# Patient Record
Sex: FEMALE | Race: WHITE | Employment: OTHER | URBAN - METROPOLITAN AREA
[De-identification: names, ages, dates, MRNs, and addresses within clinical notes are randomized per-mention and may not be internally consistent; named-entity substitution may affect disease eponyms.]

---

## 2017-04-28 ENCOUNTER — OFFICE VISIT (OUTPATIENT)
Dept: INTERNAL MEDICINE CLINIC | Age: 52
End: 2017-04-28

## 2017-04-28 VITALS
DIASTOLIC BLOOD PRESSURE: 65 MMHG | OXYGEN SATURATION: 100 % | WEIGHT: 152 LBS | SYSTOLIC BLOOD PRESSURE: 114 MMHG | HEIGHT: 66 IN | HEART RATE: 65 BPM | RESPIRATION RATE: 18 BRPM | TEMPERATURE: 97.7 F | BODY MASS INDEX: 24.43 KG/M2

## 2017-04-28 DIAGNOSIS — M54.41 ACUTE RIGHT-SIDED LOW BACK PAIN WITH RIGHT-SIDED SCIATICA: Primary | ICD-10-CM

## 2017-04-28 RX ORDER — HYDROCODONE BITARTRATE AND ACETAMINOPHEN 7.5; 325 MG/1; MG/1
1 TABLET ORAL
Qty: 20 TAB | Refills: 0 | Status: SHIPPED | OUTPATIENT
Start: 2017-04-28 | End: 2017-12-04

## 2017-04-28 RX ORDER — METHYLPREDNISOLONE 4 MG/1
TABLET ORAL
Qty: 1 DOSE PACK | Refills: 0 | Status: SHIPPED | OUTPATIENT
Start: 2017-04-28 | End: 2017-12-04

## 2017-04-28 NOTE — PATIENT INSTRUCTIONS

## 2017-04-28 NOTE — MR AVS SNAPSHOT
Visit Information Date & Time Provider Department Dept. Phone Encounter #  
 4/28/2017  2:45 PM Héctor Julio PA-C [facility] [phone] Follow-up Instructions Return if symptoms worsen or fail to improve. Upcoming Health Maintenance Date Due Hepatitis C Screening 1965 DTaP/Tdap/Td series (1 - Tdap) 12/13/1984 PAP AKA CERVICAL CYTOLOGY 12/13/1984 BREAST CANCER SCRN MAMMOGRAM 12/13/2013 FOBT Q 1 YEAR AGE 50-75 12/13/2013 INFLUENZA AGE 9 TO ADULT 8/1/2016 Allergies as of 4/28/2017  Review Complete On: 4/28/2017 By: Héctor Julio PA-C No Known Allergies Current Immunizations  Reviewed on 2/21/2011 Name Date PPD 2/21/2011 TB Skin Test (PPD) Intradermal 3/26/2013 Not reviewed this visit You Were Diagnosed With   
  
 Codes Comments Acute right-sided low back pain with right-sided sciatica    -  Primary ICD-10-CM: M54.41 
ICD-9-CM: 724.2, 724.3 Vitals BP Pulse Temp Resp Height(growth percentile) Weight(growth percentile) 114/65 (BP 1 Location: Left arm, BP Patient Position: Sitting) 65 97.7 °F (36.5 °C) (Oral) 18 5' 6\" (1.676 m) 152 lb (68.9 kg) LMP SpO2 BMI OB Status Smoking Status 04/22/2017 100% 24.53 kg/m2 Having regular periods Never Smoker Vitals History BMI and BSA Data Body Mass Index Body Surface Area 24.53 kg/m 2 1.79 m 2 Preferred Pharmacy Pharmacy Name Phone [facility] [address] [place], [address] [phone] Your Updated Medication List  
  
   
This list is accurate as of: 4/28/17  3:34 PM.  Always use your most recent med list.  
  
  
  
  
 escitalopram oxalate 10 mg tablet Commonly known as:  Ranell Roosevelt Take 1 Tab by mouth daily. fluticasone 50 mcg/actuation nasal spray Commonly known as:  FLONASE  
1 [place] by Both Nostrils route two (2) times a day. HYDROcodone-acetaminophen 7.5-325 mg per tablet Commonly known as:  14[address] Take 1 Tab by mouth every six (6) hours as needed for Pain. Max Daily Amount: 4 Tabs. meclizine 25 mg tablet Commonly known as:  ANTIVERT Take 1 Tab by mouth three (3) times daily as needed for Dizziness or Nausea. methylPREDNISolone 4 mg tablet Commonly known as:  MEDROL (FRANCISCO JAVIER) Use as directed XANAX 2 mg tablet Generic drug:  ALPRAZolam  
Take  by mouth. Prescriptions Printed Refills HYDROcodone-acetaminophen (NORCO) 7.5-325 mg per tablet 0 Sig: Take 1 Tab by mouth every six (6) hours as needed for Pain. Max Daily Amount: 4 Tabs. Class: Print Route: Oral  
  
Prescriptions Sent to Pharmacy Refills  
 methylPREDNISolone (MEDROL, FRANCISCO JAVIER,) 4 mg tablet 0 Sig: Use as directed Class: Normal  
 Pharmacy: [id number] - [place], [address] #: [phone] Follow-up Instructions Return if symptoms worsen or fail to improve. To-Do List   
 04/28/2017 Imaging:  XR SPINE LUMB 2 OR 3 V Patient Instructions Low Back Pain: Exercises Your Care Instructions Here are some examples of typical rehabilitation exercises for your condition. Start each exercise slowly. Ease off the exercise if you start to have pain. Your doctor or physical therapist will tell you when you can start these exercises and which ones will work best for you. How to do the exercises Press-up 1. Lie on your stomach, supporting your body with your forearms. 2. Press your elbows down into the floor to raise your upper back. As you do this, relax your stomach muscles and allow your back to arch without using your back muscles. As your press up, do not let your hips or pelvis come off the floor. 3. Hold for 15 to 30 seconds, then relax. 4. Repeat 2 to 4 times. Alternate arm and leg (bird dog) exercise Note: Do this exercise slowly. Try to keep your body straight at all times, and do not let one hip drop lower than the other. 1. Start on the floor, on your hands and knees. 2. Tighten your belly muscles. 3. Raise one leg off the floor, and hold it straight out behind you. Be careful not to let your hip drop down, because that will twist your trunk. 4. Hold for about 6 seconds, then lower your leg and switch to the other leg. 5. Repeat 8 to 12 times on each leg. 6. Over time, work up to holding for 10 to 30 seconds each time. 7. If you feel stable and secure with your leg raised, try raising the opposite arm straight out in front of you at the same time. Knee-to-chest exercise 1. Lie on your back with your knees bent and your feet flat on the floor. 2. Bring one knee to your chest, keeping the other foot flat on the floor (or keeping the other leg straight, whichever feels better on your lower back). 3. Keep your lower back pressed to the floor. Hold for at least 15 to 30 seconds. 4. Relax, and lower the knee to the starting position. 5. Repeat with the other leg. Repeat 2 to 4 times with each leg. 6. To get more stretch, put your other leg flat on the floor while pulling your knee to your chest. 
Curl-ups 1. Lie on the floor on your back with your knees bent at a 90-degree angle. Your feet should be flat on the floor, about 12 inches from your buttocks. 2. Cross your arms over your chest. If this bothers your neck, try putting your hands behind your neck (not your head), with your elbows spread apart. 3. Slowly tighten your belly muscles and raise your shoulder blades off the floor. 4. Keep your head in line with your body, and do not press your chin to your chest. 
5. Hold this position for 1 or 2 seconds, then slowly lower yourself back down to the floor. 6. Repeat 8 to 12 times. Pelvic tilt exercise 1. Lie on your back with your knees bent. 2. \"Brace\" your stomach. This means to tighten your muscles by pulling in and imagining your belly button moving toward your spine. You should feel like your back is pressing to the floor and your hips and pelvis are rocking back. 3. Hold for about 6 seconds while you breathe smoothly. 4. Repeat 8 to 12 times. Heel dig bridging 1. Lie on your back with both knees bent and your ankles bent so that only your heels are digging into the floor. Your knees should be bent about 90 degrees. 2. Then push your heels into the floor, squeeze your buttocks, and lift your hips off the floor until your shoulders, hips, and knees are all in a straight line. 3. Hold for about 6 seconds as you continue to breathe normally, and then slowly lower your hips back down to the floor and rest for up to 10 seconds. 4. Do 8 to 12 repetitions. Hamstring stretch in doorway 1. Lie on your back in a doorway, with one leg through the open door. 2. Slide your leg up the wall to straighten your knee. You should feel a gentle stretch down the back of your leg. 3. Hold the stretch for at least 15 to 30 seconds. Do not arch your back, point your toes, or bend either knee. Keep one heel touching the floor and the other heel touching the wall. 4. Repeat with your other leg. 5. Do 2 to 4 times for each leg. Hip flexor stretch 1. Kneel on the floor with one knee bent and one leg behind you. Place your forward knee over your foot. Keep your other knee touching the floor. 2. Slowly push your hips forward until you feel a stretch in the upper thigh of your rear leg. 3. Hold the stretch for at least 15 to 30 seconds. Repeat with your other leg. 4. Do 2 to 4 times on each side. Wall sit 1. Stand with your back 10 to 12 inches away from a wall. 2. Lean into the wall until your back is flat against it. 3. Slowly slide down until your knees are slightly bent, pressing your lower back into the wall. 4. Hold for about 6 seconds, then slide back up the wall. 5. Repeat 8 to 12 times. Follow-up care is a key part of your treatment and safety. Be sure to make and go to all appointments, and call your doctor if you are having problems. It's also a good idea to know your test results and keep a list of the medicines you take. Where can you learn more? Go to http://tracey-brandon.info/. Enter H529 in the search box to learn more about \"Low Back Pain: Exercises. \" Current as of: May 23, 2016 Content Version: 11.2 © 1434-0216 VODECLIC. Care instructions adapted under license by [employer] (which disclaims liability or warranty for this information). If you have questions about a medical condition or this instruction, always ask your healthcare professional. [address] any warranty or liability for your use of this information. Introducing hospitals & HEALTH SERVICES! Deny Hardwick introduces EchoSign patient portal. Now you can access parts of your medical record, email your doctor's office, and request medication refills online. 1. In your internet browser, go to [email or web address] 2. Click on the First Time User? Click Here link in the Sign In box. You will see the New Member Sign Up page. 3. Enter your EchoSign Access Code exactly as it appears below. You will not need to use this code after youve completed the sign-up process. If you do not sign up before the expiration date, you must request a new code. · EchoSign Access Code: [email or web address] Expires: 7/27/2017  3:34 PM 
 
4. Enter the last four digits of your Social Security Number (xxxx) and Date of Birth (mm/dd/yyyy) as indicated and click Submit. You will be taken to the next sign-up page. 5. Create a EchoSign ID. This will be your EchoSign login ID and cannot be changed, so think of one that is secure and easy to remember. 6. Create a [facility] password. You can change your password at any time. 7. Enter your Password Reset Question and Answer. This can be used at a later time if you forget your password. 8. Enter your e-mail address. You will receive e-mail notification when new information is available in [address] 9. Click Sign Up. You can now view and download portions of your medical record. 10. Click the Download Summary menu link to download a portable copy of your medical information. If you have questions, please visit the Frequently Asked Questions section of the [facility] website. Remember, [facility] is NOT to be used for urgent needs. For medical emergencies, dial 911. Now available from your iPhone and Android! Please provide this summary of care documentation to your next provider. If you have any questions after today's visit, please call [phone].

## 2017-04-28 NOTE — PROGRESS NOTES
HISTORY OF PRESENT ILLNESS  Francisca Sanderson is a 46 y.o. female. HPI Ms. Clarita Kaufman is here for 1 day - starting today h/o right sided low back pain radiating down her right leg. She has 800mg motrin at home which she has taken 1 of with no relief of pain. She is having back tightness and spasm. Her sx started today when she had bent over at her sink and stood back up. She has a h/o intermittent back pain, stating she develops similar sx a few times per year. She does not usually go to the doctor for it and the pain will usually resolve in a few days. The pain is more severe today which is why she came in. Review of Systems   Constitutional: Negative. HENT: Negative. Respiratory: Negative. Cardiovascular: Negative. Gastrointestinal: Negative. Genitourinary: Negative. Musculoskeletal: Positive for back pain. Neurological: Positive for tingling (right leg). Negative for focal weakness. Physical Exam   Constitutional: She is oriented to person, place, and time. She appears well-developed and well-nourished. Cardiovascular: Normal rate. Pulmonary/Chest: Effort normal.   Musculoskeletal:        Lumbar back: She exhibits decreased range of motion, tenderness and pain. Back:    Neurological: She is alert and oriented to person, place, and time. Visit Vitals    /65 (BP 1 Location: Left arm, BP Patient Position: Sitting)    Pulse 65    Temp 97.7 °F (36.5 °C) (Oral)    Resp 18    Ht 5' 6\" (1.676 m)    Wt 152 lb (68.9 kg)    SpO2 100%    BMI 24.53 kg/m2     There is no  record of pt. ASSESSMENT and PLAN    ICD-10-CM ICD-9-CM    1.  Acute right-sided low back pain with right-sided sciatica M54.41 724.2 XR SPINE LUMB 2 OR 3 V     724.3 HYDROcodone-acetaminophen (NORCO) 7.5-325 mg per tablet      methylPREDNISolone (MEDROL, FRANCISCO JAVIER,) 4 mg tablet     Pt verbalized understanding of their condition and diagnoses, treatment plan,  as well as side effects of any new medications prescribed.

## 2017-04-28 NOTE — PROGRESS NOTES
Chief Complaint   Patient presents with    Establish Care    Back Pain     Pt states its her sciatic nerve. Pt states she has not seen anyone for her back at all     1. Have you been to the ER, urgent care clinic since your last visit? Hospitalized since your last visit? No    2. Have you seen or consulted any other health care providers outside of the [address] [place] [address] since your last visit? Include any pap smears or colon screening.  No

## 2017-05-01 ENCOUNTER — TELEPHONE (OUTPATIENT)
Dept: INTERNAL MEDICINE CLINIC | Age: 52
End: 2017-05-01

## 2017-05-01 NOTE — TELEPHONE ENCOUNTER
Her xray showed arthritic changes and degenerative disc disease. If sx persist, she would need an MRI.

## 2017-05-01 NOTE — LETTER
5/2/2017 5:13 PM 
 
Ms. Aleksander Valerio[address] [place] [address] Our office has tried reaching you regarding you recent x-rays. Your xray showed arthritic changes and degenerative disc disease. If your symptoms persist, you would need an MRI.   
 
 
 
 
 
Sincerely, 
 
 
Dione Galarza PA-C

## 2017-05-15 ENCOUNTER — TELEPHONE (OUTPATIENT)
Dept: INTERNAL MEDICINE CLINIC | Age: 52
End: 2017-05-15

## 2017-05-15 DIAGNOSIS — M54.5 LOW BACK PAIN, UNSPECIFIED BACK PAIN LATERALITY, UNSPECIFIED CHRONICITY, WITH SCIATICA PRESENCE UNSPECIFIED: Primary | ICD-10-CM

## 2017-05-15 DIAGNOSIS — M51.36 DDD (DEGENERATIVE DISC DISEASE), LUMBAR: ICD-10-CM

## 2017-05-15 DIAGNOSIS — M47.816 FACET ARTHROPATHY, LUMBAR: ICD-10-CM

## 2017-05-23 DIAGNOSIS — M51.36 DDD (DEGENERATIVE DISC DISEASE), LUMBAR: ICD-10-CM

## 2017-05-23 DIAGNOSIS — M54.5 LOW BACK PAIN, UNSPECIFIED BACK PAIN LATERALITY, UNSPECIFIED CHRONICITY, WITH SCIATICA PRESENCE UNSPECIFIED: ICD-10-CM

## 2017-05-23 DIAGNOSIS — M47.816 FACET ARTHROPATHY, LUMBAR: ICD-10-CM

## 2017-06-07 ENCOUNTER — TELEPHONE (OUTPATIENT)
Dept: INTERNAL MEDICINE CLINIC | Age: 52
End: 2017-06-07

## 2017-06-09 DIAGNOSIS — M51.36 DEGENERATIVE DISC DISEASE, LUMBAR: Primary | ICD-10-CM

## 2017-06-09 NOTE — TELEPHONE ENCOUNTER
She has multilevel degenerative changes. L3-4 has has moderate to severe disc space narrowing. If her pain is still severe I will refer her to neurosurgery for an evaluation.

## 2017-12-04 ENCOUNTER — OFFICE VISIT (OUTPATIENT)
Dept: INTERNAL MEDICINE CLINIC | Age: 52
End: 2017-12-04

## 2017-12-04 VITALS
TEMPERATURE: 98.1 F | RESPIRATION RATE: 18 BRPM | WEIGHT: 159 LBS | SYSTOLIC BLOOD PRESSURE: 123 MMHG | BODY MASS INDEX: 25.55 KG/M2 | DIASTOLIC BLOOD PRESSURE: 83 MMHG | HEIGHT: 66 IN | OXYGEN SATURATION: 99 % | HEART RATE: 70 BPM

## 2017-12-04 DIAGNOSIS — F51.01 PRIMARY INSOMNIA: ICD-10-CM

## 2017-12-04 DIAGNOSIS — M54.5 LOW BACK PAIN, UNSPECIFIED BACK PAIN LATERALITY, UNSPECIFIED CHRONICITY, WITH SCIATICA PRESENCE UNSPECIFIED: Primary | ICD-10-CM

## 2017-12-04 LAB
BILIRUB UR QL STRIP: NEGATIVE
GLUCOSE UR-MCNC: NEGATIVE MG/DL
KETONES P FAST UR STRIP-MCNC: NEGATIVE MG/DL
PH UR STRIP: 7 [PH] (ref 4.6–8)
PROT UR QL STRIP: NEGATIVE
SP GR UR STRIP: 1.02 (ref 1–1.03)
UA UROBILINOGEN AMB POC: NORMAL (ref 0.2–1)
URINALYSIS CLARITY POC: CLEAR
URINALYSIS COLOR POC: YELLOW
URINE BLOOD POC: NEGATIVE
URINE LEUKOCYTES POC: NEGATIVE
URINE NITRITES POC: NEGATIVE

## 2017-12-04 RX ORDER — HYDROCODONE BITARTRATE AND ACETAMINOPHEN 5; 325 MG/1; MG/1
1 TABLET ORAL
Qty: 20 TAB | Refills: 0 | Status: SHIPPED | OUTPATIENT
Start: 2017-12-04 | End: 2018-05-03

## 2017-12-04 NOTE — PATIENT INSTRUCTIONS

## 2017-12-04 NOTE — MR AVS SNAPSHOT
Visit Information Date & Time Provider Department Dept. Phone Encounter #  
 12/4/2017 10:45 AM Maria Luisa Banuelos PA-C [facility] [phone] Follow-up Instructions Return if symptoms worsen or fail to improve. Upcoming Health Maintenance Date Due Hepatitis C Screening 1965 DTaP/Tdap/Td series (1 - Tdap) 12/13/1984 PAP AKA CERVICAL CYTOLOGY 12/13/1984 BREAST CANCER SCRN MAMMOGRAM 12/13/2013 FOBT Q 1 YEAR AGE 50-75 12/13/2013 Influenza Age 5 to Adult 8/1/2017 Allergies as of 12/4/2017  Review Complete On: 12/4/2017 By: Maria Luisa Banuelos PA-C No Known Allergies Current Immunizations  Reviewed on 2/21/2011 Name Date PPD 2/21/2011 TB Skin Test (PPD) Intradermal 3/26/2013 Not reviewed this visit You Were Diagnosed With   
  
 Codes Comments Low back pain, unspecified back pain laterality, unspecified chronicity, with sciatica presence unspecified    -  Primary ICD-10-CM: M54.5 ICD-9-CM: 724.2 Primary insomnia     ICD-10-CM: F51.01 
ICD-9-CM: 307.42 Vitals BP Pulse Temp Resp Height(growth percentile) Weight(growth percentile) 123/83 (BP 1 Location: Left arm, BP Patient Position: Sitting) 70 98.1 °F (36.7 °C) (Oral) 18 5' 6\" (1.676 m) 159 lb (72.1 kg) LMP SpO2 BMI OB Status Smoking Status 07/01/2017 99% 25.66 kg/m2 Having regular periods Never Smoker Vitals History BMI and BSA Data Body Mass Index Body Surface Area  
 25.66 kg/m 2 1.83 m 2 Preferred Pharmacy Pharmacy Name Phone [facility] [address] [place], [address] [phone] Your Updated Medication List  
  
   
This list is accurate as of: 12/4/17  6:05 PM.  Always use your most recent med list.  
  
  
  
  
 HYDROcodone-acetaminophen 5-325 mg per tablet Commonly known as:  Zoe Kumar Take 1 Tab by mouth every eight (8) hours as needed for Pain.  Max Daily Amount: 3 Tabs. suvorexant 15 mg tablet Commonly known as:  Payal Tinoco Take 1 Tab by mouth nightly as needed for Insomnia. Max Daily Amount: 15 mg.  
  
  
  
  
Prescriptions Printed Refills HYDROcodone-acetaminophen (NORCO) 5-325 mg per tablet 0 Sig: Take 1 Tab by mouth every eight (8) hours as needed for Pain. Max Daily Amount: 3 Tabs. Class: Print Route: Oral  
 suvorexant (BELSOMRA) 15 mg tablet 0 Sig: Take 1 Tab by mouth nightly as needed for Insomnia. Max Daily Amount: 15 mg.  
 Class: Print Route: Oral  
  
We Performed the Following AMB POC URINALYSIS DIP STICK AUTO W/O MICRO [33865 CPT(R)] Follow-up Instructions Return if symptoms worsen or fail to improve. Patient Instructions Insomnia: Care Instructions Your Care Instructions Insomnia is the inability to sleep well. It is a common problem for most people at some time. Insomnia may make it hard for you to get to sleep, stay asleep, or sleep as long as you need to. This can make you tired and grouchy during the day. It can also make you forgetful, less effective at work, and unhappy. Insomnia can be caused by conditions such as depression or anxiety. Pain can also affect your ability to sleep. When these problems are solved, the insomnia usually clears up. But sometimes bad sleep habits can cause insomnia. If insomnia is affecting your work or your enjoyment of life, you can take steps to improve your sleep. Follow-up care is a key part of your treatment and safety. Be sure to make and go to all appointments, and call your doctor if you are having problems. It's also a good idea to know your test results and keep a list of the medicines you take. How can you care for yourself at home? What to avoid · Do not have drinks with caffeine, such as coffee or black tea, for 8 hours before bed. · Do not smoke or use other types of tobacco near bedtime.  Nicotine is a stimulant and can keep you awake. · Avoid drinking alcohol late in the evening, because it can cause you to wake in the middle of the night. · Do not eat a big meal close to bedtime. If you are hungry, eat a light snack. · Do not drink a lot of water close to bedtime, because the need to urinate may wake you up during the night. · Do not read or watch TV in bed. Use the bed only for sleeping and sexual activity. What to try · Go to bed at the same time every night, and wake up at the same time every morning. Do not take naps during the day. · Keep your bedroom quiet, dark, and cool. · Sleep on a comfortable pillow and mattress. · If watching the clock makes you anxious, turn it facing away from you so you cannot see the time. · If you worry when you lie down, start a worry book. Well before bedtime, write down your worries, and then set the book and your concerns aside. · Try meditation or other relaxation techniques before you go to bed. · If you cannot fall asleep, get up and go to another room until you feel sleepy. Do something relaxing. Repeat your bedtime routine before you go to bed again. · Make your house quiet and calm about an hour before bedtime. Turn down the lights, turn off the TV, log off the computer, and turn down the volume on music. This can help you relax after a busy day. When should you call for help? Watch closely for changes in your health, and be sure to contact your doctor if: 
? · Your efforts to improve your sleep do not work. ? · Your insomnia gets worse. ? · You have been feeling down, depressed, or hopeless or have lost interest in things that you usually enjoy. Where can you learn more? Go to http://tracey-brandon.info/. Enter P513 in the search box to learn more about \"Insomnia: Care Instructions. \" Current as of: July 26, 2016 Content Version: 11.4 © 2538-8419 [employer].  Care instructions adapted under license by [address] (which disclaims liability or warranty for this information). If you have questions about a medical condition or this instruction, always ask your healthcare professional. [address] any warranty or liability for your use of this information. Introducing [facility]! Kg Trivedi introduces NextMusic.TV patient portal. Now you can access parts of your medical record, email your doctor's office, and request medication refills online. 1. In your internet browser, go to [email or web address] 2. Click on the First Time User? Click Here link in the Sign In box. You will see the New Member Sign Up page. 3. Enter your [email or web address] Access Code exactly as it appears below. You will not need to use this code after youve completed the sign-up process. If you do not sign up before the expiration date, you must request a new code. · [email or web address].TV Access Code: [email or web address] Expires: 3/4/2018  6:03 PM 
 
4. Enter the last four digits of your Social Security Number (xxxx) and Date of Birth (mm/dd/yyyy) as indicated and click Submit. You will be taken to the next sign-up page. 5. Create a NextMusic.TV ID. This will be your NextMusic.TV login ID and cannot be changed, so think of one that is secure and easy to remember. 6. Create a NextMusic.TV password. You can change your password at any time. 7. Enter your Password Reset Question and Answer. This can be used at a later time if you forget your password. 8. Enter your e-mail address. You will receive e-mail notification when new information is available in [address] 9. Click Sign Up. You can now view and download portions of your medical record. 10. Click the Download Summary menu link to download a portable copy of your medical information. If you have questions, please visit the Frequently Asked Questions section of the NextMusic.TV website.  Remember, NextMusic.TV is NOT to be used for urgent needs. For medical emergencies, dial 911. Now available from your iPhone and Android! Please provide this summary of care documentation to your next provider. Your primary care clinician is listed as Kecia Mayer. If you have any questions after today's visit, please call [phone].

## 2017-12-04 NOTE — PROGRESS NOTES
HISTORY OF PRESENT ILLNESS  Nilesh Argueta is a 46 y.o. female. HPI   Pt is here for a flare up of back pain. Pt has been getting injections and they are not working. 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 She states pain does not feel any different from previous episodes. Denies radiating pain, dysuria, hematuria, fever, chills, urinary freq, N/V/D, and numbness/tingling. Pt also is c/o insomnia. She has tried [place] in the past and rozerem but states both made her too groggy. No Known Allergies    Current Outpatient Prescriptions   Medication Sig    HYDROcodone-acetaminophen (NORCO) 5-325 mg per tablet Take 1 Tab by mouth every eight (8) hours as needed for Pain. Max Daily Amount: 3 Tabs.  suvorexant (BELSOMRA) 15 mg tablet Take 1 Tab by mouth nightly as needed for Insomnia. Max Daily Amount: 15 mg. No current facility-administered medications for this visit. Review of Systems   Constitutional: Negative. Negative for chills, fever and malaise/fatigue. HENT: Negative. Negative for congestion, ear pain, sore throat and tinnitus. Eyes: Negative. Negative for blurred vision, double vision and photophobia. Respiratory: Negative. Negative for cough, shortness of breath and wheezing. Cardiovascular: Negative. Negative for chest pain, palpitations and leg swelling. Gastrointestinal: Negative. Negative for abdominal pain, heartburn, nausea and vomiting. Genitourinary: Negative. Negative for dysuria, frequency, hematuria and urgency. Musculoskeletal: Positive for back pain. Negative for joint pain, myalgias and neck pain. Skin: Negative. Negative for itching and rash. Neurological: Negative. Negative for dizziness, tingling, tremors and headaches. Psychiatric/Behavioral: Negative for depression and memory loss. The patient has insomnia. The patient is not nervous/anxious.       Visit Vitals    /83 (BP 1 Location: Left arm, BP Patient Position: Sitting)    Pulse 70    Temp 98.1 °F (36.7 °C) (Oral)    Resp 18    Ht 5' 6\" (1.676 m)    Wt 159 lb (72.1 kg)    SpO2 99%    BMI 25.66 kg/m2       Physical Exam   Constitutional: She is oriented to person, place, and time. She appears well-developed and well-nourished. No distress. HENT:   Head: Normocephalic and atraumatic. Cardiovascular: Normal rate, regular rhythm and normal heart sounds. Exam reveals no gallop and no friction rub. No murmur heard. Pulmonary/Chest: Effort normal and breath sounds normal. No respiratory distress. She has no wheezes. She has no rales. Musculoskeletal:        Lumbar back: She exhibits decreased range of motion (due to pain), tenderness and spasm. She exhibits no bony tenderness. Neurological: She is alert and oriented to person, place, and time. Skin: Skin is warm and dry. She is not diaphoretic. Psychiatric: She has a normal mood and affect. Her behavior is normal.       ASSESSMENT and PLAN    ICD-10-CM ICD-9-CM    1. Low back pain, unspecified back pain laterality, unspecified chronicity, with sciatica presence unspecified M54.5 724.2 AMB POC URINALYSIS DIP STICK AUTO W/O MICRO   2. Primary insomnia F51.01 307.42 suvorexant (BELSOMRA) 15 mg tablet     Follow-up Disposition:  Return if symptoms worsen or fail to improve. Pt expressed understanding of visit summary and plans for any follow ups or referrals as well as any medications prescribed.

## 2018-05-03 ENCOUNTER — OFFICE VISIT (OUTPATIENT)
Dept: INTERNAL MEDICINE CLINIC | Age: 53
End: 2018-05-03

## 2018-05-03 VITALS
HEIGHT: 66 IN | BODY MASS INDEX: 24.91 KG/M2 | HEART RATE: 79 BPM | RESPIRATION RATE: 18 BRPM | OXYGEN SATURATION: 100 % | SYSTOLIC BLOOD PRESSURE: 109 MMHG | WEIGHT: 155 LBS | TEMPERATURE: 98.5 F | DIASTOLIC BLOOD PRESSURE: 73 MMHG

## 2018-05-03 DIAGNOSIS — R30.0 BURNING WITH URINATION: Primary | ICD-10-CM

## 2018-05-03 DIAGNOSIS — R31.9 HEMATURIA, UNSPECIFIED TYPE: ICD-10-CM

## 2018-05-03 DIAGNOSIS — R82.998 LEUKOCYTES IN URINE: ICD-10-CM

## 2018-05-03 LAB
BILIRUB UR QL STRIP: NEGATIVE
GLUCOSE UR-MCNC: NEGATIVE MG/DL
KETONES P FAST UR STRIP-MCNC: NEGATIVE MG/DL
PH UR STRIP: 5.5 [PH] (ref 4.6–8)
PROT UR QL STRIP: NORMAL
SP GR UR STRIP: 1.02 (ref 1–1.03)
UA UROBILINOGEN AMB POC: NORMAL (ref 0.2–1)
URINALYSIS CLARITY POC: NORMAL
URINALYSIS COLOR POC: NORMAL
URINE BLOOD POC: NORMAL
URINE LEUKOCYTES POC: NORMAL
URINE NITRITES POC: NEGATIVE

## 2018-05-03 RX ORDER — SULFAMETHOXAZOLE AND TRIMETHOPRIM 800; 160 MG/1; MG/1
1 TABLET ORAL 2 TIMES DAILY
Qty: 20 TAB | Refills: 0 | Status: SHIPPED | OUTPATIENT
Start: 2018-05-03 | End: 2018-05-03 | Stop reason: SDUPTHER

## 2018-05-03 RX ORDER — GABAPENTIN 300 MG/1
CAPSULE ORAL
Refills: 1 | COMMUNITY
Start: 2018-02-24

## 2018-05-03 RX ORDER — SULFAMETHOXAZOLE AND TRIMETHOPRIM 800; 160 MG/1; MG/1
1 TABLET ORAL 2 TIMES DAILY
Qty: 20 TAB | Refills: 0 | Status: SHIPPED | OUTPATIENT
Start: 2018-05-03 | End: 2018-05-08

## 2018-05-03 NOTE — PROGRESS NOTES
Chief Complaint   Patient presents with    Urinary Pain     1. Have you been to the ER, urgent care clinic since your last visit? Hospitalized since your last visit? No    2. Have you seen or consulted any other health care providers outside of the [address] [place] [address] since your last visit? Include any pap smears or colon screening.  No

## 2018-05-03 NOTE — PROGRESS NOTES
HISTORY OF PRESENT ILLNESS  Pasquale Solis is a 45 y.o. female. HPI Ms. Miranda Arias is here for 5 day history of dysuria. She is not having significant frequency. The urinary discomfort is the main sx. She thinks she may have seen some blood in the urine this am. She has also had some recent vaginal spotting and was found to have uterine lining thickening. She is being treated by gynecology and is on Provera 10mg. Review of Systems   Constitutional: Negative for chills and fever. Respiratory: Negative. Cardiovascular: Negative. Gastrointestinal: Negative for abdominal pain, nausea and vomiting. Genitourinary: Positive for dysuria and hematuria. Negative for flank pain and frequency. Physical Exam   Constitutional: She is oriented to person, place, and time. She appears well-developed and well-nourished. No distress. HENT:   Head: Normocephalic and atraumatic. Cardiovascular: Normal rate and regular rhythm. Pulmonary/Chest: Effort normal and breath sounds normal. She has no wheezes. Abdominal: Soft. There is tenderness (+suprapubic tenderness). No flank tenderness with percussion   Musculoskeletal: She exhibits no edema. Neurological: She is alert and oriented to person, place, and time.      Visit Vitals    /73 (BP 1 Location: Left arm, BP Patient Position: Sitting)    Pulse 79    Temp 98.5 °F (36.9 °C) (Oral)    Resp 18    Ht 5' 6\" (1.676 m)    Wt 155 lb (70.3 kg)    SpO2 100%    BMI 25.02 kg/m2     Results for orders placed or performed in visit on 05/03/18   AMB POC URINALYSIS DIP STICK AUTO W/O MICRO   Result Value Ref Range    Color (UA POC) Dark Teresa     Clarity (UA POC) Cloudy     Glucose (UA POC) Negative Negative    Bilirubin (UA POC) Negative Negative    Ketones (UA POC) Negative Negative    Specific gravity (UA POC) 1.020 1.001 - 1.035    Blood (UA POC) 3+ Negative    pH (UA POC) 5.5 4.6 - 8.0    Protein (UA POC) 1+ Negative    Urobilinogen (UA POC) 0.2 mg/dL 0.2 - 1    Nitrites (UA POC) Negative Negative    Leukocyte esterase (UA POC) 3+ Negative         ASSESSMENT and PLAN    ICD-10-CM ICD-9-CM    1. Burning with urination R30.0 788.1 AMB POC URINALYSIS DIP STICK AUTO W/O MICRO   2. Leukocytes in urine R82.99 791.7 CULTURE, URINE   3. Hematuria, unspecified type R31.9 599.70 URINALYSIS W/ RFLX MICROSCOPIC   will contact her after culture results are available  Pt verbalized understanding of their condition and diagnoses, treatment plan,  as well as side effects of any new medications prescribed.

## 2018-05-03 NOTE — MR AVS SNAPSHOT
[address] 
 
 
 [address] [facility] [address] 
[phone] Patient: Ciaran Fox MRN: [id number] :1965 Visit Information Date & Time Provider Department Dept. Phone Encounter #  
 5/3/2018 10:30 AM Jeet Valera PA-C [facility] [phone] [id number] Follow-up Instructions Return if symptoms worsen or fail to improve. Your Appointments 2018  9:00 AM  
PHYSICAL with Jeet Valera PA-C [facility] [place] [address] [facility]) Appt Note: CPE w/ fasting labs [address]ub[address] [facility] [address]  
[phone]  
  
   
 [address] [phone] [facility] [address] Upcoming Health Maintenance Date Due Hepatitis C Screening 1965 DTaP/Tdap/Td series (1 - Tdap) 1984 PAP AKA CERVICAL CYTOLOGY 1984 BREAST CANCER SCRN MAMMOGRAM 2013 Influenza Age 5 to Adult 2018 COLONOSCOPY 2023 Allergies as of 5/3/2018  Review Complete On: 5/3/2018 By: Jeet Valera PA-C No Known Allergies Current Immunizations  Reviewed on 2011 Name Date PPD 2011 TB Skin Test (PPD) Intradermal 3/26/2013 Not reviewed this visit You Were Diagnosed With   
  
 Codes Comments Burning with urination    -  Primary ICD-10-CM: R30.0 ICD-9-CM: 788.1 Leukocytes in urine     ICD-10-CM: R82.99 
ICD-9-CM: 791.7 Hematuria, unspecified type     ICD-10-CM: R31.9 ICD-9-CM: 599.70 Vitals BP Pulse Temp Resp Height(growth percentile) Weight(growth percentile) 109/73 (BP 1 Location: Left arm, BP Patient Position: Sitting) 79 98.5 °F (36.9 °C) (Oral) 18 5' 6\" (1.676 m) 155 lb (70.3 kg) LMP SpO2 BMI OB Status Smoking Status 2017 100% 25.02 kg/m2 Postmenopausal Never Smoker Vitals History BMI and BSA Data Body Mass Index Body Surface Area 25.02 kg/m 2 1.81 m 2 Preferred Pharmacy Pharmacy Name Phone [address] [facility],[address] [place], [address] [place] [address] [facility] [phone] Your Updated Medication List  
  
   
This list is accurate as of 5/3/18 11:38 AM.  Always use your most recent med list.  
  
  
  
  
 gabapentin 300 mg capsule Commonly known as:  NEURONTIN  
  
 trimethoprim-sulfamethoxazole 160-800 mg per tablet Commonly known as:  BACTRIM DS, SEPTRA DS Take 1 Tab by mouth two (2) times a day for 10 days. Prescriptions Sent to Pharmacy Refills  
 trimethoprim-sulfamethoxazole (BACTRIM DS, SEPTRA DS) 160-800 mg per tablet 0 Sig: Take 1 Tab by mouth two (2) times a day for 10 days. Class: Normal  
 Pharmacy: [address] [facility], [address] Leonardo White Florestine Captain Ph #: [phone] Route: Oral  
  
We Performed the Following AMB POC URINALYSIS DIP STICK AUTO W/O MICRO [34990 CPT(R)] Follow-up Instructions Return if symptoms worsen or fail to improve. Introducing [facility] & HEALTH SERVICES! Shabnam Kessler introduces [employer] patient portal. Now you can access parts of your medical record, email your doctor's office, and request medication refills online. 1. In your internet browser, go to [email or web address] 2. Click on the First Time User? Click Here link in the Sign In box. You will see the New Member Sign Up page. 3. Enter your [employer] Access Code exactly as it appears below. You will not need to use this code after youve completed the sign-up process. If you do not sign up before the expiration date, you must request a new code. · [employer] Access Code: [email or web address] Expires: 8/1/2018 11:38 AM 
 
4. Enter the last four digits of your Social Security Number (xxxx) and Date of Birth (mm/dd/yyyy) as indicated and click Submit. You will be taken to the next sign-up page. 5. Create a [employer] ID. This will be your [employer] login ID and cannot be changed, so think of one that is secure and easy to remember. 6. Create a [employer] password. You can change your password at any time. 7. Enter your Password Reset Question and Answer. This can be used at a later time if you forget your password. 8. Enter your e-mail address. You will receive e-mail notification when new information is available in [address] 9. Click Sign Up. You can now view and download portions of your medical record. 10. Click the Download Summary menu link to download a portable copy of your medical information. If you have questions, please visit the Frequently Asked Questions section of the [employer] website. Remember, [employer] is NOT to be used for urgent needs. For medical emergencies, dial 911. Now available from your iPhone and Android! Please provide this summary of care documentation to your next provider. Your primary care clinician is listed as Joao Winter. If you have any questions after today's visit, please call [phone].

## 2018-05-08 ENCOUNTER — OFFICE VISIT (OUTPATIENT)
Dept: INTERNAL MEDICINE CLINIC | Age: 53
End: 2018-05-08

## 2018-05-08 VITALS
TEMPERATURE: 98.6 F | WEIGHT: 154 LBS | HEART RATE: 76 BPM | DIASTOLIC BLOOD PRESSURE: 80 MMHG | OXYGEN SATURATION: 99 % | RESPIRATION RATE: 18 BRPM | HEIGHT: 66 IN | SYSTOLIC BLOOD PRESSURE: 116 MMHG | BODY MASS INDEX: 24.75 KG/M2

## 2018-05-08 DIAGNOSIS — Z00.00 ROUTINE GENERAL MEDICAL EXAMINATION AT A HEALTH CARE FACILITY: Primary | ICD-10-CM

## 2018-05-08 DIAGNOSIS — M25.50 ARTHRALGIA, UNSPECIFIED JOINT: ICD-10-CM

## 2018-05-08 DIAGNOSIS — Z11.59 ENCOUNTER FOR HEPATITIS C SCREENING TEST FOR LOW RISK PATIENT: ICD-10-CM

## 2018-05-08 DIAGNOSIS — R31.9 HEMATURIA, UNSPECIFIED TYPE: ICD-10-CM

## 2018-05-08 DIAGNOSIS — R07.9 CHEST PAIN, UNSPECIFIED TYPE: ICD-10-CM

## 2018-05-08 LAB
BILIRUB UR QL STRIP: NEGATIVE
GLUCOSE UR-MCNC: NEGATIVE MG/DL
KETONES P FAST UR STRIP-MCNC: NEGATIVE MG/DL
PH UR STRIP: 6 [PH] (ref 4.6–8)
PROT UR QL STRIP: NEGATIVE
SP GR UR STRIP: 1.01 (ref 1–1.03)
UA UROBILINOGEN AMB POC: NORMAL (ref 0.2–1)
URINALYSIS CLARITY POC: CLEAR
URINALYSIS COLOR POC: YELLOW
URINE BLOOD POC: NORMAL
URINE LEUKOCYTES POC: NEGATIVE
URINE NITRITES POC: NEGATIVE

## 2018-05-08 RX ORDER — MEDROXYPROGESTERONE ACETATE 10 MG/1
TABLET ORAL
COMMUNITY

## 2018-05-08 NOTE — PROGRESS NOTES
Chief Complaint   Patient presents with    Complete Physical     1. Have you been to the ER, urgent care clinic since your last visit? Hospitalized since your last visit? No    2. Have you seen or consulted any other health care providers outside of the [address] [place] [address] since your last visit? Include any pap smears or colon screening.  Yes Where: GYN

## 2018-05-08 NOTE — PROGRESS NOTES
HISTORY OF PRESENT ILLNESS  Nayla Jose is a 45 y.o. female. HPI Ms. Lolly Selby is here for a routine physical exam.   She does c/o hand stiffness and joint pain, worse in the am. She also c/o knee pain and elbow pain. 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 She has occasional chest pressure/heaviness. She denies dyspnea or diaphoresis. She has family hx significant for her father having a fatal MI at age 36, as well as a paternal uncle who  at 45 from a heart attack. She is up to date on her colonoscopy, mammogram and PAP smear. She sees gynecology later this month or next. She is taking provera for the first 10 days of 3 months for a thickened uterine lining. She does see a dermatologist for skin checks. Review of Systems   Constitutional: Negative. HENT: Negative. Eyes: Negative. Respiratory: Negative. Cardiovascular: Positive for chest pain (pressure, with stress). Gastrointestinal: Negative. Genitourinary: Negative. Musculoskeletal: Positive for joint pain (hands, knees, elbows). Neurological: Negative for dizziness. Physical Exam   Constitutional: She is oriented to person, place, and time. She appears well-developed and well-nourished. No distress. HENT:   Head: Normocephalic and atraumatic. Eyes: Conjunctivae are normal.   Neck: Normal range of motion. Neck supple. No thyromegaly present. Cardiovascular: Normal rate and regular rhythm. No murmur heard. Pulmonary/Chest: Effort normal and breath sounds normal. She has no wheezes. Abdominal: Soft. Bowel sounds are normal.   Musculoskeletal: She exhibits no edema. Lymphadenopathy:     She has no cervical adenopathy. Neurological: She is alert and oriented to person, place, and time. Psychiatric: She has a normal mood and affect.  Her behavior is normal. Judgment and thought content normal.     Visit Vitals    /80 (BP 1 Location: Left arm, BP Patient Position: Sitting)  Pulse 76    Temp 98.6 °F (37 °C) (Oral)    Resp 18    Ht 5' 6\" (1.676 m)    Wt 154 lb (69.9 kg)    SpO2 99%    BMI 24.86 kg/m2     Results for orders placed or performed in visit on 05/08/18   AMB POC URINALYSIS DIP STICK AUTO W/O MICRO   Result Value Ref Range    Color (UA POC) Yellow     Clarity (UA POC) Clear     Glucose (UA POC) Negative Negative    Bilirubin (UA POC) Negative Negative    Ketones (UA POC) Negative Negative    Specific gravity (UA POC) 1.015 1.001 - 1.035    Blood (UA POC) Trace Negative    pH (UA POC) 6.0 4.6 - 8.0    Protein (UA POC) Negative Negative    Urobilinogen (UA POC) 0.2 mg/dL 0.2 - 1    Nitrites (UA POC) Negative Negative    Leukocyte esterase (UA POC) Negative Negative     EKg: NSR. No ST changes. ASSESSMENT and PLAN    ICD-10-CM ICD-9-CM    1. Routine general medical examination at a health care facility Z00.00 V70.0 medroxyPROGESTERone (PROVERA) 10 mg tablet      METABOLIC PANEL, COMPREHENSIVE      LIPID PANEL      CBC WITH AUTOMATED DIFF      TSH 3RD GENERATION      AMB POC URINALYSIS DIP STICK AUTO W/O MICRO      COLLECTION VENOUS BLOOD,VENIPUNCTURE      METABOLIC PANEL, COMPREHENSIVE      LIPID PANEL      CBC WITH AUTOMATED DIFF      TSH 3RD GENERATION   2. Encounter for hepatitis C screening test for low risk patient Z11.59 V73.89 HEPATITIS C AB      HEPATITIS C AB   3. Chest pain, unspecified type R07.9 786.50 AMB POC EKG ROUTINE W/ 12 LEADS, INTER & REP   4. Arthralgia, unspecified joint M25.50 719.40 RHEUMATOID FACTOR, QL      RHEUMATOID FACTOR, QL   5. Hematuria, unspecified type R31.9 599.70 URINALYSIS W/MICROSCOPIC      URINALYSIS W/MICROSCOPIC     Pt verbalized understanding of their condition and diagnoses, treatment plan,  as well as side effects of any new medications prescribed.

## 2018-05-08 NOTE — MR AVS SNAPSHOT
[address] [place] [address] [address] [facility] [address] 
[phone] Patient: Devorah Woodard MRN: [id number] :1965 Visit Information Date & Time Provider Department Dept. Phone Encounter #  
 2018  9:00 AM Lewis Saint, PA-C [facility] [phone] [id number] Follow-up Instructions Return for follow up after testing/and or labs. Upcoming Health Maintenance Date Due  
 PAP AKA CERVICAL CYTOLOGY 2018* DTaP/Tdap/Td series (1 - Tdap) 2018* Influenza Age 5 to Adult 2018 BREAST CANCER SCRN MAMMOGRAM 2019 COLONOSCOPY 2023 *Topic was postponed. The date shown is not the original due date. Allergies as of 2018  Review Complete On: 2018 By: Lewis Saint, PA-C No Known Allergies Current Immunizations  Reviewed on 2011 Name Date PPD 2011 TB Skin Test (PPD) Intradermal 3/26/2013 Not reviewed this visit You Were Diagnosed With   
  
 Codes Comments Routine general medical examination at a health care facility    -  Primary ICD-10-CM: Z00.00 ICD-9-CM: V70.0 Encounter for hepatitis C screening test for low risk patient     ICD-10-CM: Z11.59 
ICD-9-CM: V73.89 Chest pain, unspecified type     ICD-10-CM: R07.9 ICD-9-CM: 786.50 Arthralgia, unspecified joint     ICD-10-CM: M25.50 ICD-9-CM: 719.40 Hematuria, unspecified type     ICD-10-CM: R31.9 ICD-9-CM: 599.70 Vitals BP Pulse Temp Resp Height(growth percentile) Weight(growth percentile) 116/80 (BP 1 Location: Left arm, BP Patient Position: Sitting) 76 98.6 °F (37 °C) (Oral) 18 5' 6\" (1.676 m) 154 lb (69.9 kg) LMP SpO2 BMI OB Status Smoking Status 2017 99% 24.86 kg/m2 Postmenopausal Never Smoker Vitals History BMI and BSA Data Body Mass Index Body Surface Area  
 24.86 kg/m 2 1.8 m 2 Preferred Pharmacy Pharmacy Name Phone [facility] [place] Amisha [address] [facility],[address] [place], [address] [place] [address] Leonardo Panda Wentworth [phone] Your Updated Medication List  
  
   
This list is accurate as of 5/8/18 11:26 AM.  Always use your most recent med list.  
  
  
  
  
 gabapentin 300 mg capsule Commonly known as:  NEURONTIN  
  
 PROVERA 10 mg tablet Generic drug:  medroxyPROGESTERone Take 1 tablet every day by oral route as directed. We Performed the Following AMB POC EKG ROUTINE W/ 12 LEADS, INTER & REP [72340 CPT(R)] AMB POC URINALYSIS DIP STICK AUTO W/O MICRO [86562 CPT(R)] CBC WITH AUTOMATED DIFF [22177 CPT(R)] COLLECTION VENOUS BLOOD,VENIPUNCTURE [id number] CPT(R)] HEPATITIS C AB [64274 CPT(R)] LIPID PANEL [04444 CPT(R)] METABOLIC PANEL, COMPREHENSIVE [11447 CPT(R)] RHEUMATOID FACTOR, QL [id number] CPT(R)] TSH 3RD GENERATION [49464 CPT(R)] URINALYSIS W/MICROSCOPIC [87234 CPT(R)] Follow-up Instructions Return for follow up after testing/and or labs. To-Do List   
 05/08/2018 Lab:  CBC WITH AUTOMATED DIFF   
  
 05/08/2018 Lab:  HEPATITIS C AB   
  
 05/08/2018 Lab:  LIPID PANEL   
  
 05/08/2018 Lab:  METABOLIC PANEL, COMPREHENSIVE   
  
 05/08/2018 Lab:  RHEUMATOID FACTOR, QL   
  
 05/08/2018 Lab:  TSH 3RD GENERATION   
  
 05/08/2018 Lab:  URINALYSIS W/MICROSCOPIC Introducing [facility] & HEALTH SERVICES! Cliff Sharma introduces [employer] patient portal. Now you can access parts of your medical record, email your doctor's office, and request medication refills online. 1. In your internet browser, go to [email or web address] 2. Click on the First Time User? Click Here link in the Sign In box. You will see the New Member Sign Up page. 3. Enter your [employer] Access Code exactly as it appears below. You will not need to use this code after youve completed the sign-up process.  If you do not sign up before the expiration date, you must request a new code. · [employer] Access Code: [email or web address] Expires: 8/1/2018 11:38 AM 
 
4. Enter the last four digits of your Social Security Number (xxxx) and Date of Birth (mm/dd/yyyy) as indicated and click Submit. You will be taken to the next sign-up page. 5. Create a [employer] ID. This will be your [employer] login ID and cannot be changed, so think of one that is secure and easy to remember. 6. Create a [employer] password. You can change your password at any time. 7. Enter your Password Reset Question and Answer. This can be used at a later time if you forget your password. 8. Enter your e-mail address. You will receive e-mail notification when new information is available in [address] 9. Click Sign Up. You can now view and download portions of your medical record. 10. Click the Download Summary menu link to download a portable copy of your medical information. If you have questions, please visit the Frequently Asked Questions section of the [employer] website. Remember, [employer] is NOT to be used for urgent needs. For medical emergencies, dial 911. Now available from your iPhone and Android! Please provide this summary of care documentation to your next provider. Your primary care clinician is listed as Christine Aaron. If you have any questions after today's visit, please call [phone].

## 2018-05-08 NOTE — PATIENT INSTRUCTIONS
Well Visit, Women 48 to 72: Care Instructions  Your Care Instructions    Physical exams can help you stay healthy. Your doctor has checked your overall health and may have suggested ways to take good care of yourself. He or she also may have recommended tests. At home, you can help prevent illness with healthy eating, regular exercise, and other steps. Follow-up care is a key part of your treatment and safety. Be sure to make and go to all appointments, and call your doctor if you are having problems. It's also a good idea to know your test results and keep a list of the medicines you take. How can you care for yourself at home? · Reach and stay at a healthy weight. This will lower your risk for many problems, such as obesity, diabetes, heart disease, and high blood pressure. · Get at least 30 minutes of exercise on most days of the week. Walking is a good choice. You also may want to do other activities, such as running, swimming, cycling, or playing tennis or team sports. · Do not smoke. Smoking can make health problems worse. If you need help quitting, talk to your doctor about stop-smoking programs and medicines. These can increase your chances of quitting for good. · Protect your skin from too much sun. When you're outdoors from 10 a.m. to 4 p.m., stay in the shade or cover up with clothing and a hat with a wide brim. Wear sunglasses that block UV rays. Even when it's cloudy, put broad-spectrum sunscreen (SPF 30 or higher) on any exposed skin. · See a dentist one or two times a year for checkups and to have your teeth cleaned. · Wear a seat belt in the car. · Limit alcohol to 1 drink a day. Too much alcohol can cause health problems. Follow your doctor's advice about when to have certain tests. These tests can spot problems early. · Cholesterol.  Your doctor will tell you how often to have this done based on your age, family history, or other things that can increase your risk for heart attack and stroke. · Blood pressure. Have your blood pressure checked during a routine doctor visit. Your doctor will tell you how often to check your blood pressure based on your age, your blood pressure results, and other factors. · Mammogram. Ask your doctor how often you should have a mammogram, which is an X-ray of your breasts. A mammogram can spot breast cancer before it can be felt and when it is easiest to treat. · Pap test and pelvic exam. Ask your doctor how often you should have a Pap test. You may not need to have a Pap test as often as you used to. · Vision. Have your eyes checked every year or two or as often as your doctor suggests. Some experts recommend that you have yearly exams for glaucoma and other age-related eye problems starting at age 48. · Hearing. Tell your doctor if you notice any change in your hearing. You can have tests to find out how well you hear. · Diabetes. Ask your doctor whether you should have tests for diabetes. · Colon cancer. You should begin tests for colon cancer at age 48. You may have one of several tests. Your doctor will tell you how often to have tests based on your age and risk. Risks include whether you already had a precancerous polyp removed from your colon or whether your parents, sisters and brothers, or children have had colon cancer. · Thyroid disease. Talk to your doctor about whether to have your thyroid checked as part of a regular physical exam. Women have an increased chance of a thyroid problem. · Osteoporosis. You should begin tests for bone density at age 72. If you are younger than 72, ask your doctor whether you have factors that may increase your risk for this disease. You may want to have this test before age 72. · Heart attack and stroke risk. At least every 4 to 6 years, you should have your risk for heart attack and stroke assessed.  Your doctor uses factors such as your age, blood pressure, cholesterol, and whether you smoke or have diabetes to show what your risk for a heart attack or stroke is over the next 10 years. When should you call for help? Watch closely for changes in your health, and be sure to contact your doctor if you have any problems or symptoms that concern you. Where can you learn more? Go to http://tracey-brandon.info/. Enter A040 in the search box to learn more about \"Well Visit, Women 50 to 72: Care Instructions. \"  Current as of: May 12, 2017  Content Version: 11.4  © 5041-5400 [employer]wise, [employer]. Care instructions adapted under license by [employer] (which disclaims liability or warranty for this information). If you have questions about a medical condition or this instruction, always ask your healthcare professional. [address] any warranty or liability for your use of this information.

## 2018-05-09 LAB
A-G RATIO,AGRAT: 1.9 RATIO (ref 1.1–2.6)
ABSOLUTE LYMPHOCYTE COUNT, 10803: 2.2 K/UL (ref 1–4.8)
ALBUMIN SERPL-MCNC: 4.1 G/DL (ref 3.5–5)
ALP SERPL-CCNC: 72 U/L (ref 25–115)
ALT SERPL-CCNC: 7 U/L (ref 5–40)
ANION GAP SERPL CALC-SCNC: 13 MMOL/L
APPEARANCE UR: ABNORMAL
AST SERPL W P-5'-P-CCNC: 15 U/L (ref 10–37)
BACTERIA #/AREA URNS HPF: ABNORMAL /[HPF]
BACTERIA UR CULT: ABNORMAL
BACTERIA UR CULT: ABNORMAL
BASOPHILS # BLD: 0.1 K/UL (ref 0–0.2)
BASOPHILS NFR BLD: 1 % (ref 0–2)
BILIRUB SERPL-MCNC: 0.5 MG/DL (ref 0.2–1.2)
BILIRUB UR QL STRIP: NEGATIVE
BILIRUB UR QL: NEGATIVE
BUN SERPL-MCNC: 11 MG/DL (ref 6–22)
CALCIUM SERPL-MCNC: 9.4 MG/DL (ref 8.4–10.5)
CASTS URNS QL MICRO: ABNORMAL /LPF
CHLORIDE SERPL-SCNC: 105 MMOL/L (ref 98–110)
CHOLEST SERPL-MCNC: 132 MG/DL (ref 110–200)
CO2 SERPL-SCNC: 24 MMOL/L (ref 20–32)
COLOR UR: YELLOW
CREAT SERPL-MCNC: 0.9 MG/DL (ref 0.5–1.2)
EOSINOPHIL # BLD: 0.1 K/UL (ref 0–0.5)
EOSINOPHIL NFR BLD: 1 % (ref 0–6)
EPI CELLS #/AREA URNS HPF: ABNORMAL /HPF
EPITHELIAL,EPSU: ABNORMAL /HPF (ref 0–2)
ERYTHROCYTE [DISTWIDTH] IN BLOOD BY AUTOMATED COUNT: 13.9 % (ref 10–15.5)
GFRAA, 66117: >60
GFRNA, 66118: >60
GLOBULIN,GLOB: 2.2 G/DL (ref 2–4)
GLUCOSE SERPL-MCNC: 87 MG/DL (ref 70–99)
GLUCOSE UR QL: NEGATIVE
GLUCOSE UR QL: NEGATIVE MG/DL
GRANULOCYTES,GRANS: 56 % (ref 40–75)
HCT VFR BLD AUTO: 44 % (ref 35.1–48)
HCV AB SER IA-ACNC: NORMAL
HDLC SERPL-MCNC: 3.4 MG/DL (ref 0–5)
HDLC SERPL-MCNC: 39 MG/DL (ref 40–59)
HGB BLD-MCNC: 14.7 G/DL (ref 11.7–16)
HGB UR QL STRIP: ABNORMAL
HGB UR QL STRIP: NEGATIVE
HYLINE CAST, 6014: ABNORMAL /LPF
KETONES UR QL STRIP.AUTO: NEGATIVE MG/DL
KETONES UR QL STRIP: NEGATIVE
LDLC SERPL CALC-MCNC: 64 MG/DL (ref 50–99)
LEUKOCYTE ESTERASE UR QL STRIP: ABNORMAL
LEUKOCYTE ESTERASE: NEGATIVE
LYMPHOCYTES, LYMLT: 34 % (ref 20–45)
MCH RBC QN AUTO: 32 PG (ref 26–34)
MCHC RBC AUTO-ENTMCNC: 33 G/DL (ref 31–36)
MCV RBC AUTO: 95 FL (ref 80–95)
MICRO URNS: ABNORMAL
MONOCYTES # BLD: 0.5 K/UL (ref 0.1–1)
MONOCYTES NFR BLD: 7 % (ref 3–12)
NEUTROPHILS # BLD AUTO: 3.6 K/UL (ref 1.8–7.7)
NITRITE UR QL STRIP.AUTO: NEGATIVE
NITRITE UR QL STRIP: POSITIVE
PH UR STRIP: 5 PH (ref 5–8)
PH UR STRIP: 5 [PH] (ref 5–7.5)
PLATELET # BLD AUTO: 264 K/UL (ref 140–440)
PMV BLD AUTO: 13.1 FL (ref 9–13)
POTASSIUM SERPL-SCNC: 5.3 MMOL/L (ref 3.5–5.5)
PROT SERPL-MCNC: 6.3 G/DL (ref 6.4–8.3)
PROT UR QL STRIP: ABNORMAL
PROT UR QL STRIP: NEGATIVE MG/DL
RBC # BLD AUTO: 4.63 M/UL (ref 3.8–5.2)
RBC #/AREA URNS HPF: >30 /HPF
RBC #/AREA URNS HPF: ABNORMAL /HPF
RHEUMATOID FACTOR QUANT, IMMUNOTURBIDIMETRIC: <20 IU/ML (ref 0–20)
SODIUM SERPL-SCNC: 142 MMOL/L (ref 133–145)
SP GR UR: 1.02 (ref 1–1.03)
SP GR UR: 1.02 (ref 1–1.03)
TRIGL SERPL-MCNC: 143 MG/DL (ref 40–149)
TSH SERPL DL<=0.005 MIU/L-ACNC: 2.51 MCU/ML (ref 0.27–4.2)
UROBILINOGEN UR STRIP-MCNC: 0.2 MG/DL (ref 0.2–1)
UROBILINOGEN UR STRIP-MCNC: <2 MG/DL
VLDLC SERPL CALC-MCNC: 29 MG/DL (ref 8–30)
WBC # BLD AUTO: 6.4 K/UL (ref 4–11)
WBC #/AREA URNS HPF: >30 /HPF
WBC URNS QL MICRO: ABNORMAL /HPF (ref 0–2)

## 2018-05-11 ENCOUNTER — TELEPHONE (OUTPATIENT)
Dept: INTERNAL MEDICINE CLINIC | Age: 53
End: 2018-05-11

## 2018-07-30 ENCOUNTER — OFFICE VISIT (OUTPATIENT)
Dept: INTERNAL MEDICINE CLINIC | Age: 53
End: 2018-07-30

## 2018-07-30 VITALS
WEIGHT: 154 LBS | OXYGEN SATURATION: 99 % | TEMPERATURE: 98 F | HEART RATE: 74 BPM | DIASTOLIC BLOOD PRESSURE: 66 MMHG | HEIGHT: 66 IN | SYSTOLIC BLOOD PRESSURE: 108 MMHG | RESPIRATION RATE: 18 BRPM | BODY MASS INDEX: 24.75 KG/M2

## 2018-07-30 DIAGNOSIS — M25.50 CHRONIC PAIN OF MULTIPLE JOINTS: Primary | ICD-10-CM

## 2018-07-30 DIAGNOSIS — G89.29 CHRONIC PAIN OF MULTIPLE JOINTS: Primary | ICD-10-CM

## 2018-07-30 DIAGNOSIS — K14.6 BURNING MOUTH SYNDROME: ICD-10-CM

## 2018-07-30 RX ORDER — MELOXICAM 15 MG/1
15 TABLET ORAL DAILY
Qty: 90 TAB | Refills: 1 | Status: SHIPPED | OUTPATIENT
Start: 2018-07-30

## 2018-07-30 NOTE — PATIENT INSTRUCTIONS
Musculoskeletal Pain: Care Instructions  Your Care Instructions    Different problems with the bones, muscles, nerves, ligaments, and tendons in the body can cause pain. One or more areas of your body may ache or burn. Or they may feel tired, stiff, or sore. The medical term for this type of pain is musculoskeletal pain. It can have many different causes. Sometimes the pain is caused by an injury such as a strain or sprain. Or you might have pain from using one part of your body in the same way over and over again. This is called overuse. In some cases, the cause of the pain is another health problem such as arthritis or fibromyalgia. The doctor will examine you and ask you questions about your health to help find the cause of your pain. Blood tests or imaging tests like an X-ray may also be helpful. But sometimes doctors can't find a cause of the pain. Treatment depends on your symptoms and the cause of the pain, if known. The doctor has checked you carefully, but problems can develop later. If you notice any problems or new symptoms, get medical treatment right away. Follow-up care is a key part of your treatment and safety. Be sure to make and go to all appointments, and call your doctor if you are having problems. It's also a good idea to know your test results and keep a list of the medicines you take. How can you care for yourself at home? · Rest until you feel better. · Do not do anything that makes the pain worse. Return to exercise gradually if you feel better and your doctor says it's okay. · Be safe with medicines. Read and follow all instructions on the label. ¨ If the doctor gave you a prescription medicine for pain, take it as prescribed. ¨ If you are not taking a prescription pain medicine, ask your doctor if you can take an over-the-counter medicine. · Put ice or a cold pack on the area for 10 to 20 minutes at a time to ease pain.  Put a thin cloth between the ice and your skin.  When should you call for help? Call your doctor now or seek immediate medical care if:    · You have new pain, or your pain gets worse.     · You have new symptoms such as a fever, a rash, or chills.    Watch closely for changes in your health, and be sure to contact your doctor if:    · You do not get better as expected. Where can you learn more? Go to http://tracey-brandon.info/. Enter M060 in the search box to learn more about \"Musculoskeletal Pain: Care Instructions. \"  Current as of: October 9, 2017  Content Version: 11.7  © 5977-6831 [employer]. Care instructions adapted under license by [employer] (which disclaims liability or warranty for this information). If you have questions about a medical condition or this instruction, always ask your healthcare professional. [address] any warranty or liability for your use of this information.

## 2018-07-30 NOTE — MR AVS SNAPSHOT
[address] [facility] [address] [facility] [address] 
[phone] Patient: Heidy Bergman MRN: [id number] :[id number] Visit Information Date & Time Provider Department Dept. Phone Encounter #  
 2018  2:00 PM Sushma Hartmann PA-C [facility] [phone] Follow-up Instructions Return for follow up after testing/and or labs. Upcoming Health Maintenance Date Due DTaP/Tdap/Td series (1 - Tdap) 1984 PAP AKA CERVICAL CYTOLOGY 1984 Influenza Age 5 to Adult 2018 BREAST CANCER SCRN MAMMOGRAM 2019 COLONOSCOPY 2023 Allergies as of 2018  Review Complete On: 2018 By: Sushma Hartmann PA-C No Known Allergies Current Immunizations  Reviewed on 2011 Name Date PPD 2011 TB Skin Test (PPD) Intradermal 3/26/2013 Not reviewed this visit You Were Diagnosed With   
  
 Codes Comments Chronic pain of multiple joints    -  Primary ICD-10-CM: M25.50, G89.29 ICD-9-CM: 719.49, 338.29 Burning mouth syndrome     ICD-10-CM: K14.6 ICD-9-CM: 529.6 Vitals BP Pulse Temp Resp Height(growth percentile) Weight(growth percentile) 108/66 (BP 1 Location: Left arm, BP Patient Position: Sitting) 74 98 °F (36.7 °C) (Oral) 18 5' 6\" (1.676 m) 154 lb (69.9 kg) LMP SpO2 BMI OB Status Smoking Status 2017 99% 24.86 kg/m2 Postmenopausal Never Smoker Vitals History BMI and BSA Data Body Mass Index Body Surface Area  
 24.86 kg/m 2 1.8 m 2 Preferred Pharmacy Pharmacy Name Phone [address] [facility],[address] Shanna [place]oton [phone] Your Updated Medication List  
  
   
This list is accurate as of 18  6:08 PM.  Always use your most recent med list.  
  
  
  
  
 gabapentin 300 mg capsule Commonly known as:  NEURONTIN  
  
 meloxicam 15 mg tablet Commonly known as:  MOBIC Take 1 Tab by mouth daily. With food PROVERA 10 mg tablet Generic drug:  medroxyPROGESTERone Take 1 tablet every day by oral route as directed. Prescriptions Sent to Pharmacy Refills  
 meloxicam (MOBIC) 15 mg tablet 1 Sig: Take 1 Tab by mouth daily. With food Class: Normal  
 Pharmacy: [address], [address] #: [phone] Route: Oral  
  
We Performed the Following CONSUELO, DIRECT, W/REFLEX [id number] CPT(R)] CBC WITH AUTOMATED DIFF [33665 CPT(R)] COLLECTION VENOUS BLOOD,VENIPUNCTURE [id number] CPT(R)] LYME AB TOTAL W/RFLX W BLOT [[id number] Custom] REFERRAL TO RHEUMATOLOGY [FPH00 Custom] Comments:  
 Please evaluate patient for pain multiple joints. RHEUMATOID FACTOR, QL [id number] CPT(R)] SED RATE (ESR) [id number] CPT(R)] VITAMIN B12 [id number] CPT(R)] VITAMIN D, 25 HYDROXY [id number] CPT(R)] Follow-up Instructions Return for follow up after testing/and or labs. To-Do List   
 07/30/2018 Lab:  CONSUELO, DIRECT, W/REFLEX   
  
 07/30/2018 Lab:  CBC WITH AUTOMATED DIFF   
  
 07/30/2018 Lab:  LYME AB TOTAL W/RFLX W BLOT   
  
 07/30/2018 Lab:  RHEUMATOID FACTOR, QL   
  
 07/30/2018 Lab:  SED RATE (ESR)   
  
 07/30/2018 Lab:  VITAMIN B12   
  
 07/30/2018 Lab:  VITAMIN D, 25 HYDROXY Referral Information Referral ID Referred By Referred To  
  
 [id number] Ayesha Presume Not Available Visits Status Start Date End Date 1 New Request 7/30/18 7/30/19 If your referral has a status of pending review or denied, additional information will be sent to support the outcome of this decision. Introducing [facility]! [facility] introduces Kik patient portal. Now you can access parts of your medical record, email your doctor's office, and request medication refills online.    
 
1. In your internet browser, go to [email or web address] 2. Click on the First Time User? Click Here link in the Sign In box. You will see the New Member Sign Up page. 3. Enter your Wagon Access Code exactly as it appears below. You will not need to use this code after youve completed the sign-up process. If you do not sign up before the expiration date, you must request a new code. · Wagon Access Code: [email or web address] Expires: 8/1/2018 11:38 AM 
 
4. Enter the last four digits of your Social Security Number (xxxx) and Date of Birth (mm/dd/yyyy) as indicated and click Submit. You will be taken to the next sign-up page. 5. Create a Wagon ID. This will be your Wagon login ID and cannot be changed, so think of one that is secure and easy to remember. 6. Create a Wagon password. You can change your password at any time. 7. Enter your Password Reset Question and Answer. This can be used at a later time if you forget your password. 8. Enter your e-mail address. You will receive e-mail notification when new information is available in [address] 9. Click Sign Up. You can now view and download portions of your medical record. 10. Click the Download Summary menu link to download a portable copy of your medical information. If you have questions, please visit the Frequently Asked Questions section of the Wagon website. Remember, Wagon is NOT to be used for urgent needs. For medical emergencies, dial 911. Now available from your iPhone and Android! Please provide this summary of care documentation to your next provider. Lyme Disease Testing Disclaimer:   
 § 36.8-0638.9. (Expires July 1, 2018) Lyme disease testing information disclosure. A. Every licensee or his in-office designee who orders a laboratory test for the presence of Lyme disease shall provide to the patient or his legal representative the following written information: \"ACCORDING TO THE CENTERS FOR DISEASE CONTROL AND PREVENTION, AS OF 2011 LYME DISEASE IS THE SIXTH FASTEST GROWING DISEASE IN THE [place]. YOUR HEALTH CARE PROVIDER HAS ORDERED A LABORATORY TEST FOR THE PRESENCE OF LYME DISEASE FOR YOU. CURRENT LABORATORY TESTING FOR LYME DISEASE CAN BE PROBLEMATIC AND STANDARD LABORATORY TESTS OFTEN RESULT IN FALSE NEGATIVE AND FALSE POSITIVE RESULTS, AND IF DONE TOO EARLY, YOU MAY NOT HAVE PRODUCED ENOUGH ANTIBODIES TO BE CONSIDERED POSITIVE BECAUSE YOUR IMMUNE RESPONSE REQUIRES TIME TO DEVELOP ANTIBODIES. IF YOU ARE TESTED FOR LYME DISEASE, AND THE RESULTS ARE NEGATIVE, THIS DOES NOT NECESSARILY MEAN YOU DO NOT HAVE LYME DISEASE. IF YOU CONTINUE TO EXPERIENCE SYMPTOMS, YOU SHOULD CONTACT YOUR HEALTH CARE PROVIDER AND INQUIRE ABOUT THE APPROPRIATENESS OF RETESTING OR ADDITIONAL TREATMENT. \"  
B. Licensees shall be immune from civil liability for the provision of the written information required by this section absent gross negligence or willful misconduct. Your primary care clinician is listed as Natasha Luu. If you have any questions after today's visit, please call [phone].

## 2018-07-30 NOTE — PROGRESS NOTES
HISTORY OF PRESENT ILLNESS  John Ovalle is a 45 y.o. female. HPI Ms. Javid Davis is here for c/o joint pain. She c/o ankle pain and the bottoms of her feet when she first gets out of bed. She states that even a minor activity such as moving a light lawn chair feels like it takes too much effort. She c/o knee, hip, elbow and shoulder pain. She said even taking her shirt off causes discomfort in her shoulders. She notices occasional swelling in her hands, but no swelling, deformities of other joints. She states she used to like to run 5ks, 10ks but now it hurts her too much. She also reports a sensation of burning of her tongue and then her mouth and throat. She has looked up information and said it sounds like burning mouth syndrome. Review of Systems   Constitutional: Negative for chills, fever and malaise/fatigue. Respiratory: Negative. Cardiovascular: Negative. Musculoskeletal: Positive for joint pain. Skin: Negative for rash. Neurological: Negative for dizziness and headaches. Physical Exam   Constitutional: She is oriented to person, place, and time. She appears well-developed and well-nourished. No distress. HENT:   Head: Normocephalic and atraumatic. Musculoskeletal: She exhibits no edema or deformity. She is not tender to palpation of her joints. No joint deformities. Neurological: She is alert and oriented to person, place, and time. Skin: No rash noted. Psychiatric: She has a normal mood and affect. Her behavior is normal. Judgment and thought content normal.     Visit Vitals    /66 (BP 1 Location: Left arm, BP Patient Position: Sitting)    Pulse 74    Temp 98 °F (36.7 °C) (Oral)    Resp 18    Ht 5' 6\" (1.676 m)    Wt 154 lb (69.9 kg)    SpO2 99%    BMI 24.86 kg/m2       ASSESSMENT and PLAN    ICD-10-CM ICD-9-CM    1.  Chronic pain of multiple joints M25.50 719.49 CONSUELO, DIRECT, W/REFLEX    G89.29 338.29 CBC WITH AUTOMATED DIFF      SED RATE (ESR) RHEUMATOID FACTOR, QL      LYME AB TOTAL W/RFLX W BLOT      VITAMIN D, 25 HYDROXY      REFERRAL TO RHEUMATOLOGY      meloxicam (MOBIC) 15 mg tablet      COLLECTION VENOUS BLOOD,VENIPUNCTURE      CONSUELO, DIRECT, W/REFLEX      CBC WITH AUTOMATED DIFF      SED RATE (ESR)      RHEUMATOID FACTOR, QL      LYME AB TOTAL W/RFLX W BLOT      VITAMIN D, 25 HYDROXY   2. Burning mouth syndrome K14.6 529.6 VITAMIN B12      VITAMIN B12     Pt verbalized understanding of their condition and diagnoses, treatment plan,  as well as side effects of any new medications prescribed.

## 2018-07-30 NOTE — PROGRESS NOTES
Chief Complaint   Patient presents with    Joint Pain     every joint she has per pt      1. Have you been to the ER, urgent care clinic since your last visit? Hospitalized since your last visit? No    2. Have you seen or consulted any other health care providers outside of the [facility] since your last visit? Include any pap smears or colon screening.  No

## 2018-08-01 LAB
25(OH)D3 SERPL-MCNC: 22 NG/ML (ref 32–100)
ABSOLUTE LYMPHOCYTE COUNT, 10803: 2.7 K/UL (ref 1–4.8)
ANA SCREEN, IFA: NEGATIVE
B BURGDOR IGG+IGM SER-ACNC: <0.9 INDEX
BASOPHILS # BLD: 0.1 K/UL (ref 0–0.2)
BASOPHILS NFR BLD: 1 % (ref 0–2)
EOSINOPHIL # BLD: 0.1 K/UL (ref 0–0.5)
EOSINOPHIL NFR BLD: 1 % (ref 0–6)
ERYTHROCYTE [DISTWIDTH] IN BLOOD BY AUTOMATED COUNT: 13.4 % (ref 10–15.5)
GRANULOCYTES,GRANS: 65 % (ref 40–75)
HCT VFR BLD AUTO: 41.2 % (ref 35.1–48)
HGB BLD-MCNC: 14.1 G/DL (ref 11.7–16)
LYMPHOCYTES, LYMLT: 27 % (ref 20–45)
MCH RBC QN AUTO: 32 PG (ref 26–34)
MCHC RBC AUTO-ENTMCNC: 34 G/DL (ref 31–36)
MCV RBC AUTO: 94 FL (ref 80–95)
MONOCYTES # BLD: 0.7 K/UL (ref 0.1–1)
MONOCYTES NFR BLD: 7 % (ref 3–12)
NEUTROPHILS # BLD AUTO: 6.5 K/UL (ref 1.8–7.7)
PLATELET # BLD AUTO: 268 K/UL (ref 140–440)
PMV BLD AUTO: 12.3 FL (ref 9–13)
RBC # BLD AUTO: 4.4 M/UL (ref 3.8–5.2)
RHEUMATOID FACTOR QUANT, IMMUNOTURBIDIMETRIC: <20 IU/ML (ref 0–20)
SED RATE (ESR): <=2 MM/HR (ref 0–30)
VIT B12 SERPL-MCNC: 262 PG/ML (ref 211–911)
WBC # BLD AUTO: 10 K/UL (ref 4–11)

## 2018-08-02 ENCOUNTER — TELEPHONE (OUTPATIENT)
Dept: INTERNAL MEDICINE CLINIC | Age: 53
End: 2018-08-02

## 2018-08-02 DIAGNOSIS — E55.9 VITAMIN D DEFICIENCY: Primary | ICD-10-CM

## 2018-08-02 RX ORDER — ERGOCALCIFEROL 1.25 MG/1
50000 CAPSULE ORAL
Qty: 12 CAP | Refills: 0 | Status: SHIPPED | OUTPATIENT
Start: 2018-08-02 | End: 2019-11-06 | Stop reason: SDUPTHER

## 2018-08-02 NOTE — TELEPHONE ENCOUNTER
Pt aware of below message please send in rx for VIT D.  She is aware to walk in for repeat labs in 2 months

## 2018-08-02 NOTE — TELEPHONE ENCOUNTER
Her labs look good aside from low vitamin D. I will start her on once weekly vitamin D and she should still see rheumatology. If she doesn't hear about an appt, please call us. Also, she should have vitamin D rechecked in 2 months.

## 2019-09-03 ENCOUNTER — TELEPHONE (OUTPATIENT)
Dept: INTERNAL MEDICINE CLINIC | Age: 54
End: 2019-09-03

## 2019-09-03 NOTE — TELEPHONE ENCOUNTER
Spoke with the pt and she was concerned about having a cold sore. Recently on vacation and got sunburn on face, as did others with her group. Sores on bottom lip since vacation, but not like her usual cold sore. Holding 11 month old Granddaughter yesterday and baby touched her lip with hand, washed off immediately baby wipe but a little later baby put hand in mouth. Asking if she should be worried. Sores are already healing up, unlike her usual cold sores that take a long time to  [address] her she probably does not have a cold sore and that the baby should be fine, washing her hand off with the wipe was a good idea. Pt verbalized relief.

## 2019-10-21 ENCOUNTER — OFFICE VISIT (OUTPATIENT)
Dept: INTERNAL MEDICINE CLINIC | Age: 54
End: 2019-10-21

## 2019-10-21 VITALS
HEIGHT: 66 IN | RESPIRATION RATE: 18 BRPM | WEIGHT: 141 LBS | BODY MASS INDEX: 22.66 KG/M2 | HEART RATE: 89 BPM | OXYGEN SATURATION: 99 % | SYSTOLIC BLOOD PRESSURE: 103 MMHG | DIASTOLIC BLOOD PRESSURE: 67 MMHG | TEMPERATURE: 97.5 F

## 2019-10-21 DIAGNOSIS — Z00.00 ROUTINE GENERAL MEDICAL EXAMINATION AT A HEALTH CARE FACILITY: Primary | ICD-10-CM

## 2019-10-21 DIAGNOSIS — E55.9 VITAMIN D DEFICIENCY: ICD-10-CM

## 2019-10-21 DIAGNOSIS — K14.6 BURNING MOUTH SYNDROME: ICD-10-CM

## 2019-10-21 DIAGNOSIS — G47.00 INSOMNIA, UNSPECIFIED TYPE: ICD-10-CM

## 2019-10-21 RX ORDER — ZOLPIDEM TARTRATE 5 MG/1
5 TABLET ORAL
Qty: 30 TAB | Refills: 2 | Status: SHIPPED | OUTPATIENT
Start: 2019-10-21 | End: 2019-12-17 | Stop reason: SDUPTHER

## 2019-10-21 NOTE — PROGRESS NOTES
Chief Complaint   Patient presents with    Complete Physical     1. Have you been to the ER, urgent care clinic since your last visit? Hospitalized since your last visit? No    2. Have you seen or consulted any other health care providers outside of the [address] [place] [address] since your last visit? Include any pap smears or colon screening.  No

## 2019-10-21 NOTE — PROGRESS NOTES
HISTORY OF PRESENT ILLNESS  Melia Van is a 52 y.o. female. HPI Ms. Juliette Quinteros is here for a routine physical.   She had just taken phentermine and lost 30 lbs. She was seeing Dr. Aretha Doe. She did have an EKG at his office. She is still taking meloxicam for arthritis pain which helps significantly. She has seen Dr. Klaudia Blankenship. She is c/o difficulty sleeping. She does not stay asleep throughout the night. She has a long h/o difficulty sleeping. She had taken [place], [place] and xanax. She does recall that they all worked, but she remembers feeling groggy in the morning on [place]. She said it is possible she had taken the 10mg dose of ambien. Will re-try her on 5mg of ambien and advised her to make sure she expects 8  Hrs of sleep. Review of Systems   Constitutional: Negative. HENT: Negative. S/o burning mouth and tongue. She states this has been present for several years. She can not relate any specific triggers. Will check vitamin B. Advised her to try vitamin B OTC and provided information on several OTC remedies   Eyes: Negative. Respiratory: Negative. Cardiovascular: Negative. Gastrointestinal: Negative. Musculoskeletal: Positive for joint pain. Neurological: Negative. Endo/Heme/Allergies: Negative. Psychiatric/Behavioral: The patient has insomnia. Physical Exam   Constitutional: She is oriented to person, place, and time. She appears well-developed and well-nourished. No distress. HENT:   Head: Normocephalic and atraumatic. Mouth/Throat: Oropharynx is clear and moist.   Eyes: Conjunctivae are normal.   Cardiovascular: Normal rate and regular rhythm. Pulmonary/Chest: Effort normal and breath sounds normal. She has no wheezes. Musculoskeletal: She exhibits no edema. Neurological: She is alert and oriented to person, place, and time. Psychiatric: She has a normal mood and affect.  Her behavior is normal. Judgment and thought content normal.     Visit Vitals  BP 103/67 (BP 1 Location: Left arm, BP Patient Position: Sitting)   Pulse 89   Temp 97.5 °F (36.4 °C) (Oral)   Resp 18   Ht 5' 6\" (1.676 m)   Wt 141 lb (64 kg)   SpO2 99%   BMI 22.76 kg/m²     Wt Readings from Last 3 Encounters:   10/21/19 141 lb (64 kg)   07/30/18 154 lb (69.9 kg)   05/08/18 154 lb (69.9 kg)         ASSESSMENT and PLAN    ICD-10-CM ICD-9-CM    1. Routine general medical examination at a health care facility Z27.49 Y29.1 METABOLIC PANEL, COMPREHENSIVE      LIPID PANEL      CBC WITH AUTOMATED DIFF      TSH 3RD GENERATION      HEMOGLOBIN A1C WITH EAG      HEMOGLOBIN A1C WITH EAG      TSH 3RD GENERATION      CBC WITH AUTOMATED DIFF      LIPID PANEL      METABOLIC PANEL, COMPREHENSIVE   2. Insomnia, unspecified type G47.00 780.52 zolpidem (AMBIEN) 5 mg tablet   3. Burning mouth syndrome K14.6 529.6 VITAMIN B12      VITAMIN B12   4. Vitamin D deficiency E55.9 268.9 VITAMIN D, 25 HYDROXY      VITAMIN D, 25 HYDROXY     Pt verbalized understanding of their condition and diagnoses, treatment plan,  as well as side effects of any new medications prescribed.

## 2019-10-21 NOTE — PATIENT INSTRUCTIONS

## 2019-10-29 ENCOUNTER — LAB ONLY (OUTPATIENT)
Dept: INTERNAL MEDICINE CLINIC | Age: 54
End: 2019-10-29

## 2019-10-29 DIAGNOSIS — R30.0 DYSURIA: Primary | ICD-10-CM

## 2019-10-31 LAB
25(OH)D3 SERPL-MCNC: 21.4 NG/ML (ref 32–100)
A-G RATIO,AGRAT: 2.1 RATIO (ref 1.1–2.6)
ABSOLUTE LYMPHOCYTE COUNT, 10803: 2.3 K/UL (ref 1–4.8)
ALBUMIN SERPL-MCNC: 4.6 G/DL (ref 3.5–5)
ALP SERPL-CCNC: 98 U/L (ref 25–115)
ALT SERPL-CCNC: 14 U/L (ref 5–40)
ANION GAP SERPL CALC-SCNC: 14 MMOL/L
AST SERPL W P-5'-P-CCNC: 14 U/L (ref 10–37)
AVG GLU, 10930: 101 MG/DL (ref 91–123)
BASOPHILS # BLD: 0.1 K/UL (ref 0–0.2)
BASOPHILS NFR BLD: 1 % (ref 0–2)
BILIRUB SERPL-MCNC: 0.4 MG/DL (ref 0.2–1.2)
BILIRUB UR QL: NEGATIVE
BUN SERPL-MCNC: 14 MG/DL (ref 6–22)
CALCIUM SERPL-MCNC: 9.9 MG/DL (ref 8.4–10.5)
CHLORIDE SERPL-SCNC: 103 MMOL/L (ref 98–110)
CHOLEST SERPL-MCNC: 153 MG/DL (ref 110–200)
CO2 SERPL-SCNC: 28 MMOL/L (ref 20–32)
CREAT SERPL-MCNC: 0.8 MG/DL (ref 0.5–1.2)
EOSINOPHIL # BLD: 0.1 K/UL (ref 0–0.5)
EOSINOPHIL NFR BLD: 2 % (ref 0–6)
EPITHELIAL,EPSU: ABNORMAL /HPF (ref 0–2)
ERYTHROCYTE [DISTWIDTH] IN BLOOD BY AUTOMATED COUNT: 13.2 % (ref 10–15.5)
GFRAA, 66117: >60
GFRNA, 66118: >60
GLOBULIN,GLOB: 2.2 G/DL (ref 2–4)
GLUCOSE SERPL-MCNC: 93 MG/DL (ref 70–99)
GLUCOSE UR QL: NEGATIVE MG/DL
GRANULOCYTES,GRANS: 61 % (ref 40–75)
HBA1C MFR BLD HPLC: 5.1 % (ref 4.8–5.6)
HCT VFR BLD AUTO: 46.8 % (ref 35.1–48)
HDLC SERPL-MCNC: 3.1 MG/DL (ref 0–5)
HDLC SERPL-MCNC: 50 MG/DL (ref 40–59)
HGB BLD-MCNC: 14.8 G/DL (ref 11.7–16)
HGB UR QL STRIP: NEGATIVE
KETONES UR QL STRIP.AUTO: NEGATIVE MG/DL
LDLC SERPL CALC-MCNC: 67 MG/DL (ref 50–99)
LEUKOCYTE ESTERASE: ABNORMAL
LYMPHOCYTES, LYMLT: 30 % (ref 20–45)
MCH RBC QN AUTO: 31 PG (ref 26–34)
MCHC RBC AUTO-ENTMCNC: 32 G/DL (ref 31–36)
MCV RBC AUTO: 98 FL (ref 80–95)
MONOCYTES # BLD: 0.5 K/UL (ref 0.1–1)
MONOCYTES NFR BLD: 7 % (ref 3–12)
NEUTROPHILS # BLD AUTO: 4.7 K/UL (ref 1.8–7.7)
NITRITE UR QL STRIP.AUTO: POSITIVE
PH UR STRIP: 5 PH (ref 5–8)
PLATELET # BLD AUTO: 290 K/UL (ref 140–440)
PMV BLD AUTO: 11.6 FL (ref 9–13)
POTASSIUM SERPL-SCNC: 4.5 MMOL/L (ref 3.5–5.5)
PROT SERPL-MCNC: 6.8 G/DL (ref 6.4–8.3)
PROT UR QL STRIP: NEGATIVE MG/DL
RBC # BLD AUTO: 4.77 M/UL (ref 3.8–5.2)
RBC #/AREA URNS HPF: ABNORMAL /HPF
RESULT: ABNORMAL
SODIUM SERPL-SCNC: 145 MMOL/L (ref 133–145)
SP GR UR: 1.01 (ref 1–1.03)
TRANSITIONAL EPITHELIAL CELLS, 6132: ABNORMAL /HPF
TRIGL SERPL-MCNC: 183 MG/DL (ref 40–149)
TSH SERPL DL<=0.005 MIU/L-ACNC: 3.11 MCU/ML (ref 0.27–4.2)
UROBILINOGEN UR STRIP-MCNC: <2 MG/DL
VIT B12 SERPL-MCNC: 243 PG/ML (ref 211–911)
VLDLC SERPL CALC-MCNC: 37 MG/DL (ref 8–30)
WBC # BLD AUTO: 7.7 K/UL (ref 4–11)
WBC URNS QL MICRO: ABNORMAL /HPF (ref 0–2)

## 2019-11-01 ENCOUNTER — TELEPHONE (OUTPATIENT)
Dept: INTERNAL MEDICINE CLINIC | Age: 54
End: 2019-11-01

## 2019-11-01 DIAGNOSIS — N30.00 ACUTE CYSTITIS WITHOUT HEMATURIA: Primary | ICD-10-CM

## 2019-11-01 RX ORDER — CIPROFLOXACIN 500 MG/1
500 TABLET ORAL 2 TIMES DAILY
Qty: 10 TAB | Refills: 0 | Status: SHIPPED | OUTPATIENT
Start: 2019-11-01 | End: 2019-11-06 | Stop reason: ALTCHOICE

## 2019-11-01 NOTE — TELEPHONE ENCOUNTER
Patient has called several tomes about urine results. Can you please heck on her labs in the system and advise.

## 2019-11-06 DIAGNOSIS — H91.90 DECREASED HEARING, UNSPECIFIED LATERALITY: Primary | ICD-10-CM

## 2019-11-06 DIAGNOSIS — E55.9 VITAMIN D DEFICIENCY: ICD-10-CM

## 2019-11-06 RX ORDER — SULFAMETHOXAZOLE AND TRIMETHOPRIM 800; 160 MG/1; MG/1
1 TABLET ORAL 2 TIMES DAILY
Qty: 6 TAB | Refills: 0 | Status: SHIPPED | OUTPATIENT
Start: 2019-11-06 | End: 2019-11-09

## 2019-11-06 RX ORDER — ERGOCALCIFEROL 1.25 MG/1
50000 CAPSULE ORAL
Qty: 12 CAP | Refills: 0 | Status: SHIPPED | OUTPATIENT
Start: 2019-11-06

## 2019-12-04 ENCOUNTER — TELEPHONE (OUTPATIENT)
Dept: INTERNAL MEDICINE CLINIC | Age: 54
End: 2019-12-04

## 2019-12-04 NOTE — TELEPHONE ENCOUNTER
Patient would like a call from [address] in reference to the sleep aid she was prescribed. It is not working.

## 2019-12-05 NOTE — TELEPHONE ENCOUNTER
Returned pts call she did want to speak directly with Krystal Cross I did let pt know she is currently seeing pts and if I wasn't able to help her I would have Krystal Cross give her a call back. I also sent pt a text for mychart activation so that way she would be able to communicate with her via [employer] and pt is aware it is not IM. She just wanted to let Krystal Cross know her sleep medication is not working for her. Spoke with Krystal Cross and was told to tell the pt she needs to take 2 of her 5 mg ambien and if that does not work to give the office a call back.

## 2019-12-17 DIAGNOSIS — G47.00 INSOMNIA, UNSPECIFIED TYPE: ICD-10-CM

## 2019-12-17 NOTE — TELEPHONE ENCOUNTER
patient called to state that the increased dosage of 2 Frandy Dempsey is working. Due to her increasing the dosage though she is now out of medication. Please call in a new prescription at the increased dose. Requested Prescriptions     Pending Prescriptions Disp Refills    zolpidem (AMBIEN) 5 mg tablet 30 Tab 2     Sig: Take 1 Tab by mouth nightly as needed for Sleep. Max Daily Amount: 5 mg.

## 2019-12-20 RX ORDER — ZOLPIDEM TARTRATE 5 MG/1
5 TABLET ORAL
Qty: 30 TAB | Refills: 2 | Status: SHIPPED | OUTPATIENT
Start: 2019-12-20

## 2021-04-18 NOTE — TELEPHONE ENCOUNTER
Pt returned call about xray of back she would like MRI sent to [place]. ·  Her resuscitation status is "do not resuscitate "